# Patient Record
(demographics unavailable — no encounter records)

---

## 2024-10-22 NOTE — PHYSICAL EXAM
[No Acute Distress] : no acute distress [Well Nourished] : well nourished [No Deformities] : no deformities [Enlarged Base of the Tongue] : enlarged base of the tongue [Retrognathia] : retrognathia [IV] : Mallampati Class: IV [Normal Appearance] : normal appearance [No Neck Mass] : no neck mass [Normal Rate/Rhythm] : normal rate/rhythm [Normal S1, S2] : normal s1, s2 [No Murmurs] : no murmurs [No Resp Distress] : no resp distress [Clear to Auscultation Bilaterally] : clear to auscultation bilaterally [No Clubbing] : no clubbing [No Cyanosis] : no cyanosis [No Edema] : no edema [No Focal Deficits] : no focal deficits [Oriented x3] : oriented x3 [Normal Affect] : normal affect

## 2024-10-22 NOTE — ASSESSMENT
[FreeTextEntry1] : This is a 30-year-old male with a significant past medical history of anxiety and DIAMOND who is here for a follow up.  Assessment: 1. Mild Obstructive Sleep Apnea: The patient has a mild form of sleep apnea with an AHI of 6.1, which is higher when sleeping on their back compared to their sides.  2. Respiratory Disturbance Index: The patient has a moderate amount of respiratory-related events with an RDI of 16.5, which may contribute to daytime sleepiness. 3. Positional Sleep Apnea: The patient's sleep apnea is worse when lying on their back, suggesting positional therapy could be beneficial.  Plan: - Recommend positional therapy to prevent sleeping on the back, such as using a tennis ball or a body pillow. - Consider a mandibular advancement device (dental device) to improve airway space and reduce sleep apnea symptoms. - Provide a list of specialized dentists for the patient to consider for the dental device, which is covered under medical insurance.  Follow up: The patient will follow up after consulting with a specialized dentist and considering the use of a mandibular advancement device.

## 2024-10-22 NOTE — REVIEW OF SYSTEMS
[Recent Wt Loss (___ Lbs)] : ~T recent [unfilled] lb weight loss [Anxiety] : anxiety [Negative] : Endocrine [TextBox_3] : over last year

## 2024-10-22 NOTE — HISTORY OF PRESENT ILLNESS
[Never] : never [Former] : former [Awakes Unrefreshed] : awakes unrefreshed [Snoring] : snoring [Witnessed Apneas] : witnessed apneas [TextBox_4] : This is a 30-year-old male with a significant past medical history of anxiety and DIAMOND who is here for a follow up.  The patient reports that the sleep study went smoothly, and they were able to get a decent amount of sleep despite the presence of wires and equipment. They did not feel overly groggy after the study, even [place]. The patient has been sleeping well in the past month since moving to a new apartment, although they have had a few nights of poor sleep recently. They currently use an over-the-counter mouth guard at night.  of note: The patient, Baudilio, reports experiencing poor sleep for the past 10 years. He mentions that he has been told he stops breathing in the middle of the night, especially when sleeping on his back. He has been using nasal strips and mouth guards, which he feels help but do not completely resolve the issue. He notes that without these aids, he has a terrible day, but with them, he can get through the day. He has lost 15-20 pounds over the past year, which he feels has improved his energy levels somewhat. He works irregular hours, which affects his sleep schedule. He reports feeling brain foggy, anxious, and jittery if he doesn't get good sleep.  The patient does not wake up in the middle of the night currently and rarely takes naps during the day. He does not wake up with a dry mouth or headaches. He has a history of smoking marijuana recreationally but has stopped due to anxiety. He smokes tobacco occasionally, mostly during trips. He drinks alcohol recreationally, but it does not seem to affect his sleep quality significantly. He takes medication for anxiety and depression, which has recently been increased, and he feels this has helped reduce his anxiety and improve his energy levels.  The patient has a family history of snoring but no known sleep apnea. He does not have any known allergies. He is interested in undergoing a sleep study to determine if he has sleep apnea and to explore treatment options.  ______________________________________________________________________________________  EPWORTH SLEEPINESS SCALE  How likely are you to doze off or fall asleep in the situations described below, in contrast to feeling just tired?  This refers to your usual way of life in recent times.  Even if you haven't done some of these things recently, try to work out how they would have affected you.  Use the following scale to choose one most appropriate number for each situation.   0 = Never would doze  1 = Slight chance of dozing  2 = Moderate chance of dozing  3 = High chance of dozing     2........................................Sitting and reading  1........................................Watching TV  1........................................Sitting inactive in a public place (eg a theatre or a meeting)  1........................................As a passenger in a car for an hour without a break  2........................................Lying down to rest in the afternoon when circumstances permit  1........................................Sitting and talking to someone  1........................................Sitting quietly after lunch without alcohol  0........................................In a car, while stopped for a few minutes in traffic  9........................................TOTAL SCORE  ______________________________________________________________________________________   [Awakes with Dry Mouth] : does not awaken with dry mouth [Awakes with Headache] : does not awaken with headache [Frequent Nocturnal Awakening] : denies frequent nocturnal awakening [Recent  Weight Gain] : no recent weight gain [DIS] : does not have difficulty initiating sleep [DMS] : does not have difficulty maintaining sleep [Unusual Sleep Behavior] : no unusual sleep behavior [Cataplexy] : no cataplexy [Sleep Paralysis] : no sleep paralysis [Hypnagogic Hallucinations] : no hypnagogic hallucinations [Hypnopompic Hallucinations] : no hypnopompic hallucinations [Lower Extremity Discomfort] : does not have lower extremity discomfort [Irresistible urge to move legs] : does not have irresistible urge to move legs [LE discomfort relieved by movement] : denies lower extremity discomfort relieved by movement [Late day/ Evening symptoms] : does not have late day/ evening symptoms [Sleep Disturbances due to LE symptoms] : denies sleep disturbances due to lower extremity symptoms [Lab] : lab [TextBox_83] : 0 [TextBox_89] : 0 [TextBox_100] : 10/11/24 [TextBox_108] : 6.1 [TextBox_112] : 0.0 [TextBox_116] : 89 [ESS] : 9

## 2024-10-22 NOTE — REASON FOR VISIT
[Home] : at home, [unfilled] , at the time of the visit. [Medical Office: ([facility])___] : at the medical office located in  [Patient] : the patient [Follow-Up] : a follow-up visit [Sleep Apnea] : sleep apnea